# Patient Record
Sex: FEMALE | Race: WHITE
[De-identification: names, ages, dates, MRNs, and addresses within clinical notes are randomized per-mention and may not be internally consistent; named-entity substitution may affect disease eponyms.]

---

## 2018-03-28 ENCOUNTER — HOSPITAL ENCOUNTER (INPATIENT)
Dept: HOSPITAL 68 - ERH | Age: 83
LOS: 2 days | Discharge: HOME HEALTH | DRG: 193 | End: 2018-03-30
Attending: INTERNAL MEDICINE | Admitting: INTERNAL MEDICINE
Payer: COMMERCIAL

## 2018-03-28 VITALS — SYSTOLIC BLOOD PRESSURE: 142 MMHG | DIASTOLIC BLOOD PRESSURE: 60 MMHG

## 2018-03-28 VITALS — HEIGHT: 62 IN | WEIGHT: 195 LBS | BODY MASS INDEX: 35.88 KG/M2

## 2018-03-28 VITALS — SYSTOLIC BLOOD PRESSURE: 160 MMHG | DIASTOLIC BLOOD PRESSURE: 70 MMHG

## 2018-03-28 DIAGNOSIS — Z66: ICD-10-CM

## 2018-03-28 DIAGNOSIS — J44.1: ICD-10-CM

## 2018-03-28 DIAGNOSIS — Z90.2: ICD-10-CM

## 2018-03-28 DIAGNOSIS — E78.5: ICD-10-CM

## 2018-03-28 DIAGNOSIS — J96.01: ICD-10-CM

## 2018-03-28 DIAGNOSIS — Z85.118: ICD-10-CM

## 2018-03-28 DIAGNOSIS — I50.32: ICD-10-CM

## 2018-03-28 DIAGNOSIS — J10.1: Primary | ICD-10-CM

## 2018-03-28 DIAGNOSIS — Z79.51: ICD-10-CM

## 2018-03-28 DIAGNOSIS — J44.0: ICD-10-CM

## 2018-03-28 DIAGNOSIS — I11.0: ICD-10-CM

## 2018-03-28 DIAGNOSIS — J20.9: ICD-10-CM

## 2018-03-28 LAB
ABSOLUTE GRANULOCYTE CT: 5.8 /CUMM (ref 1.4–6.5)
APTT BLD: 28 SEC (ref 25–37)
BASOPHILS # BLD: 0 /CUMM (ref 0–0.2)
BASOPHILS NFR BLD: 0 % (ref 0–2)
EOSINOPHIL # BLD: 0 /CUMM (ref 0–0.7)
EOSINOPHIL NFR BLD: 0.1 % (ref 0–5)
ERYTHROCYTE [DISTWIDTH] IN BLOOD BY AUTOMATED COUNT: 18.9 % (ref 11.5–14.5)
GRANULOCYTES NFR BLD: 88.4 % (ref 42.2–75.2)
HCT VFR BLD CALC: 29.6 % (ref 37–47)
LYMPHOCYTES # BLD: 0.5 /CUMM (ref 1.2–3.4)
MCH RBC QN AUTO: 27.2 PG (ref 27–31)
MCHC RBC AUTO-ENTMCNC: 32.2 G/DL (ref 33–37)
MCV RBC AUTO: 84.4 FL (ref 81–99)
MONOCYTES # BLD: 0.2 /CUMM (ref 0.1–0.6)
PLATELET # BLD: 212 /CUMM (ref 130–400)
PMV BLD AUTO: 9.1 FL (ref 7.4–10.4)
PROTHROMBIN TIME: 12.7 SEC (ref 9.4–12.5)
RED BLOOD CELL CT: 3.51 /CUMM (ref 4.2–5.4)
WBC # BLD AUTO: 6.6 /CUMM (ref 4.8–10.8)

## 2018-03-28 NOTE — RADIOLOGY REPORT
EXAMINATION:
XR PORTABLE CHEST
 
CLINICAL INFORMATION:
History of COPD. Previous right lobectomy. Rule out pneumonia.
 
COMPARISON:
Chest x-ray dated 3/4/2018 and 5/28/2015.
 
TECHNIQUE:
Portable AP semierect view of the chest was obtained.
 
FINDINGS:
The cardiomediastinal silhouette and bilateral hilar contours are enlarged,
unchanged. Ectasia and tortuosity of the ascending and descending aorta is
seen.
 
Lungs bilaterally are symmetrically expanded. Evaluation of the lung bases is
again limited due to overlapping soft tissues. There is likely some linear
reticular opacity in the lung bases bilaterally, right greater than left,
consistent with chronic atelectasis or scarring. No definite evolving
pneumonia is seen. No effusion or pneumothorax is noted.
 
Bony structures are grossly unremarkable.
 
IMPRESSION:
 
1. Cardiomegaly and tortuous and ectatic aorta.
2. Bilateral hilar fullness, unchanged, most consistent with vascular
fullness.
3. Bibasilar chronic linear opacities, most consistent with chronic
atelectasis or scarring. No evolving superimposed pneumonia.

## 2018-03-28 NOTE — ED DYSPNEA/ASTHMA COMPLAINT
History of Present Illness
 
General
Chief Complaint: Dyspnea (COPD, CHF, Other)
Stated Complaint: SOB
Source: patient, family, old records, EMS
Exam Limitations: no limitations
 
Vital Signs & Intake/Output
Vital Signs & Intake/Output
 Vital Signs
 
 
Date Time Temp Pulse Resp B/P B/P Pulse O2 O2 Flow FiO2
 
     Mean Ox Delivery Rate 
 
 1416 98.2 66 18 124/61  93 Room Air  
 
 0913      94 Room Air  
 
 0908  74  148/68     
 
 0800      94 Room Air  
 
 0709 98.0 65 18 148/68  95 Nasal  
 
       Cannula  
 
 0000       Nasal 2.0L 
 
       Cannula  
 
 2125 98.5 73 19 160/70  96 Nasal 2.0L 
 
       Cannula  
 
 1846       Nasal 2.0L 
 
       Cannula  
 
 1829 98.3 80 19 142/60  93 Nasal 2.0L 
 
       Cannula  
 
 1640 100.0 80 24 142/69  93 Nasal 2.0L 
 
       Cannula  
 
 
 ED Intake and Output
 
 
  0000  1200
 
Intake Total 270 
 
Output Total  
 
Balance 270 
 
   
 
Intake, IV 30 
 
Intake, Oral 240 
 
Number 0 
 
Bowel  
 
Movements  
 
Patient 195 lb 195 lb
 
Weight  
 
Weight Reported by Patient Reported by Patient
 
Measurement  
 
Method  
 
 
 
Allergies
Coded Allergies:
NO KNOWN ALLERGIES (03/16/15)
 
Reconcile Medications
Albuterol Sulfate (Proair Hfa) 90 MCG HFA.AER.AD   2 PUF INH Q4-6 PRN PRN 
SHORTNESS OF BREATH  (Reported)
Atenolol 25 MG TABLET   1 TAB PO DAILY BP  (Reported)
Atorvastatin Calcium 40 MG TABLET   1 TAB PO DAILY CHOLESTEROL  (Reported)
Docusate Sodium (Colace) 100 MG CAPSULE   1 CAP PO DAILY STOOL SOFTENER  (
Reported)
Escitalopram Oxalate 20 MG TABLET   1 TAB PO DAILY MENTAL HEALTH  (Reported)
Ferrous Sulfate (IRON) 325 MG (65 MG IRON) TABLET   1 TAB PO BID SUPPLEMENT  (
Reported)
Fluticasone/Salmeterol (Advair 250-50 Diskus) 250 MCG-50 MCG/DOSE BLST.W.DEV   1
PUF INH BID BREATHING PROBLEMS  (Reported)
Furosemide 20 MG TABLET   1 TAB PO DAILY WATER RETENTION  (Reported)
Ipratropium/Albuterol Sulfate (Iprat-Albut 0.5-3(2.5) MG/3 Ml) 0.5 MG-3 MG (2.5 
MG BASE)/3 ML AMPUL.NEB   1 VIAL INH TID EMPHYSEMA/COPD  (Reported)
Melatonin 3 MG TABLET   1 TAB PO QPM SLEEP  (Reported)
Sennosides (Senna) 8.6 MG TABLET   1 TAB PO QPM CONSTIPATION  (Reported)
Tramadol HCl 50 MG TABLET   1 TAB PO BIDP PRN PAIN  (Reported)
 
Triage Nurses Notes Reviewed? yes
Onset: Abrupt
Duration: day(s): (2)
Timing: recent history
Severity: mild, moderate
Activities at Onset: none
Modifying Factors:
Worsens With: movement (EXERTION). 
Associated Symptoms: cough, SOB
HPI:
This is an 89-year-old female with history of COPD, previous lung cancer status 
post lobectomy over 10 years ago, no home oxygen therapy who presents via EMS 
from her doctor's office for chief complaint of cough, shortness of breath and 
hypoxia.  She was found to be 86% on room air and Dr. Fontenot call EMS for 
transfer to the hospital.  She admits to not feeling good for the last 2 days.  
According to the daughter approximately a month ago she was in the hospital and 
was given a second course of antibiotics and prednisone after being given one by
her PCP.  She's been okay for the past 3 weeks.  Yesterday she started emergency
pack of prednisone and amoxicillin that was given to her by Dr. Fontenot but is 
much worse today.  Patient denies any chest pain or back pain.  She denies any 
fever sweats or chills.  She was found to have a low-grade temp in the office of
his of 100.5.  She followed up once with Dr. Bryant a long time ago but does 
not see him on a routine basis.  History of PE status post surgery 10 years ago 
and was on anticoagulants for a brief time but was discontinued secondary to 
bleeding.  No significant family history of clots.  No DVT/PE diagnosed since 
then.
 
Past History
 
Travel History
Traveled to [place] past 21 day No
 
Medical History
Any Pertinent Medical History? see below for history
Cardiovascular: hypertension, hyperlipidemia
Respiratory: bronchitis, COPD, emphysema, pneumonia, R LOBECTOMY
Musculoskeletal: FRACTURE PLATE IN R  ANKLE
Psychiatric: NONE
Cancer(s): lung cancer (s/p lobectomy)
History of MRSA: No
History of VRE: No
History of CDIFF: No
 
Surgical History
Surgical History: non-contributory
 
Psychosocial History
Who do you live with Daughter
Services at Home Nursing
What is your primary language English
 
Family History
Family History, If Any:
FATHER (leukemia).
MOTHER (heart attack).
 
Hx Contributory? No
 
Review of Systems
 
Review of Systems
Constitutional:
Denies: chills, fever. 
EENTM:
Reports: no symptoms. 
Respiratory:
Reports: cough, short of breath, wheezing.  Denies: sputum production. 
Cardiovascular:
Denies: chest pain, palpitations, peripheral edema. 
GI:
Denies: abdominal pain. 
Genitourinary:
Denies: pain. 
Musculoskeletal:
Reports: no symptoms. 
Skin:
Reports: no symptoms. 
Neurological/Psychological:
Reports: no symptoms. 
Hematologic/Endocrine:
Denies: bruising, bleeding, polyuria, polydipsia. 
Immunologic/Allergic:
Reports: no symptoms. 
All Other Systems: Reviewed and Negative
 
Physical Exam
 
Physical Exam
General Appearance: well developed/nourished, alert, awake, mild distress, 
moderate distress
Head: atraumatic, normal appearance
Eyes:
Bilateral: normal appearance, PERRL. 
Ears, Nose, Throat: normal pharynx, hearing grossly normal
Neck: normal inspection, supple, full range of motion
Respiratory: decreased breath sounds, accessory muscle use, rhonchi, wheezing, 
respiratory distress
Cardiovascular: regular rate/rhythm
Peripheral Pulses:
2+ radial (R), 2+ radial (L)
Gastrointestinal: normal bowel sounds, soft, non-tender, OBESE
Extremities: pedal edema (TRACE B/L)
Neurologic/Psych: no motor/sensory deficits, awake, alert, oriented x 3, normal 
mood/affect
Skin: intact, normal color, warm/dry
 
Core Measures
ACS in differential dx? Yes
CVA/TIA Diagnosis No
Sepsis Present: No
Sepsis Focused Exam Completed? Yes
 
ED Sepsis Exam
Date of Focused Sepsis Exam: 18
Time of Focused Sepsis Exam: 1207
Sepsis Cardiac Exam: Regular Rate/Rhythm
Sepsis Resp Exam: Ronchi (AND WHEEZING)
Sepsis Cap Refill Exam: <2 Sec
Sepsis Peripheral Pulse Exam: Normal
Sepsis Peripheral Pulse Location: Radial
Sepsis Skin Color Exam: Normal for Ethnicity
Skin Temp/Moisture Exam: Warm/Dry
 
Progress
Differential Diagnosis: bronchitis, CHF, COPD, pulmonary embolism, pneumonia
Plan of Care:
 Orders
 
 
Procedure Date/time Status
 
ECHOCARDIOGRAM  1200 Active
 
MISSING MEDICATION FORM   UNK Active
 
Weight  184 Complete
 
Vital Signs  184 Active
 
Teach/Educate  184 Active
 
Pain Treatment and Response  184 Active
 
Nutritional Intake, Monitor 1840 Active
 
Isolation 03/28 1840 Active
 
Intake & Output  1840 Active
 
Patient Care Conference  1840 Active
 
Activity/Ambulation  1840 Active
 
Intake & Output  1804 Active
 
OXYGEN SETUP (GEN)  1600 Complete
 
OXYGEN SETUP CHG   UNK Complete
 
OXYGEN   UNK Complete
 
OXYGEN TRANSPORT   UNK Complete
 
MISSING MEDICATION FORM   UNK Active
 
 
 Current Medications
 
 
  Sig/Jessica Start time  Last
 
Medication Dose  Stop Time Status Admin
 
Methylprednisolone 40 MG Q12  2200 AC 
 
(Solumedrol)     
 
Atenolol 25 MG DAILY  1000 AC 
 
(Tenormin)     0908
 
Atorvastatin Calcium 40 MG DAILY  1000 AC 
 
(Lipitor)     0909
 
Azithromycin 500 MG DAILY  1000 AC 
 
(Zithromax)     1024
 
Dextrose/Water 250 ML    
 
(D5W)     
 
Enoxaparin Sodium 30 MG DAILY  1000 AC 
 
(Lovenox)     0909
 
Escitalopram Oxalate 20 MG DAILY  1000 AC 
 
(Lexapro)     0909
 
Furosemide 20 MG DAILY  1000 AC 
 
(Lasix)     0909
 
Guaifenesin 600 MG Q12  1000 AC 
 
(Mucinex)     0941
 
Melatonin 5 MG AT BEDTIME  2200 AC 
 
(Melatonin)     220
 
Oseltamivir Phosphate 30 MG BID  2200 AC 
 
(Tamiflu)    2159  0909
 
Albuterol Sulfate 2 PUF Q4-6 PRN PRN  1430 AC 
 
(Ventolin)     
 
Budesonide/ 2 PUF BID  1423 AC 
 
Formoterol Fumarate     1124
 
(Symbicort)     
 
 
1:5O PM
D/W [facility] FOR ADMISSION.  TROPONIN PENDING.
Diagnostic Imaging:
Viewed by Me: Radiology Read.  Discussed w/RAD: Radiology Read. 
CXR Impression: PATIENT: YANET BRUCE  MEDICAL RECORD NO: [id number] PRESENT 
AGE: 89  PATIENT ACCOUNT NO: [id number] : 28  LOCATION: [facility] [place] ORDERING 
PHYSICIAN: Rocio Baez MD     SERVICE DATE:  EXAM TYPE: RAD - XRY
-PORTABLE CHEST XRAY EXAMINATION: XR PORTABLE CHEST CLINICAL INFORMATION: 
History of COPD. Previous right lobectomy. Rule out pneumonia. COMPARISON: Chest
x-ray dated 3/4/2018 and 2015. TECHNIQUE: Portable AP semierect view of the
chest was obtained. FINDINGS: The cardiomediastinal silhouette and bilateral 
hilar contours are enlarged, unchanged. Ectasia and tortuosity of the ascending 
and descending aorta is seen. Lungs bilaterally are symmetrically expanded. 
Evaluation of the lung bases is again limited due to overlapping soft tissues. 
There is likely some linear reticular opacity in the lung bases bilaterally, 
right greater than left, consistent with chronic atelectasis or scarring. No 
definite evolving pneumonia is seen. No effusion or pneumothorax is noted. Bony 
structures are grossly unremarkable. IMPRESSION: 1. Cardiomegaly and tortuous 
and ectatic aorta. 2. Bilateral hilar fullness, unchanged, most consistent with 
vascular fullness. 3. Bibasilar chronic linear opacities, most consistent with 
chronic atelectasis or scarring. No evolving superimposed pneumonia. DICTATED BY
: Eliza Clements MD  DATE/TIME DICTATED:18 :
SANDY  DATE/TIME TRANSCRIBED:18 CONFIDENTIAL, DO NOT COPY 
WITHOUT APPROPRIATE AUTHORIZATION.  <Electronically signed in Other Vendor 
System>                                                                         
              SIGNED BY: Eliza Clements MD 18 123
Initial ED EKG: NSR
Rhythm Strip: normal sinus rhythm
 
Departure
 
Departure
Time of Disposition: 
Disposition: STILL A PATIENT
Condition: Stable
Clinical Impression
Primary Impression: Influenza A
Secondary Impressions: COPD exacerbation
Referrals:
Rajinder Fontenot MD (PCP/Family)
 
Departure Forms:
Customer Survey
General Discharge Information
 
Admission Note
Spoke With:
Rocío Tapia MD
Documentation of Exam:
Documentation of any treatments & extenuating circumstances including Concerns 
Regarding Discharge (functional status, medication knowledge or non-compliance, 
living conditions, etc.) that warrant an admission rather than observation: [TRC
/NEBS, IV STEROIDS, IV ABX, TAMIFLU, NASAL OXYGEN, PULMONARY EVALUATION]
 
 
Critical Care Note
 
Critical Care Note
Critical Care Time: non-applicable

## 2018-03-28 NOTE — HISTORY & PHYSICAL
Hernesto Ann 03/28/18 1355:
General Information and HPI
History of Present Illness:
Ms. Solis is a 86 year old woman with a past medical history of stage 1 
diastolic dysfunction  with a EF > 65% (2015), hypertension, hyperlipidemia, 
bronchitis, COPD not on home oxygen, and lung cancer s/p right lobectomy, PE 
post surgery placed on anticoagulation temporarily, SIB Dr. Mckeon's for 
worsening SOB, low grade fever and low O2 sats. Patient's daughter at bedside. 
Daughter reports patient has been having a persistent cough, SOB and has been 
"sick" three times in the last 6 weeks. She was treated outpatient multiple 
times with Augmentin and Prednisone but never completely resolved. Yesterday her
cough produced whitish sputum and her SOB worsened. Today she noticed watery 
discharge from R eye. She received an influenza vaccine but never tested for 
influenza. She keeps hydrated but has had a poor appetite within the last few 
weeks. She lives at an independent living facility. She visits her family 
members which many of them are also sick. She is followed by cardologist Dr. TYSON rueda though daughter reports patient does not have any cardiac conditions. She 
denies chills, CP, palpitations, myalgias, nausea, vomiting, lightheadedness, 
urinary or bowel symptoms.
 
Allergies/Medications
Allergies:
Coded Allergies:
NO KNOWN ALLERGIES (03/16/15)
 
Home Med list
Albuterol Sulfate (Proair Hfa) 90 MCG HFA.AER.AD   2 PUF INH Q4-6 PRN PRN 
SHORTNESS OF BREATH  (Reported)
Atenolol 25 MG TABLET   1 TAB PO DAILY BP  (Reported)
Atorvastatin Calcium 40 MG TABLET   1 TAB PO DAILY CHOLESTEROL  (Reported)
Docusate Sodium (Colace) 100 MG CAPSULE   1 CAP PO DAILY STOOL SOFTENER  (
Reported)
Escitalopram Oxalate 20 MG TABLET   1 TAB PO DAILY MENTAL HEALTH  (Reported)
Ferrous Sulfate (IRON) 325 MG (65 MG IRON) TABLET   1 TAB PO BID SUPPLEMENT  (
Reported)
Fluticasone/Salmeterol (Advair 250-50 Diskus) 250 MCG-50 MCG/DOSE BLST.W.DEV   1
PUF INH BID BREATHING PROBLEMS  (Reported)
Furosemide 20 MG TABLET   1 TAB PO DAILY WATER RETENTION  (Reported)
Ipratropium/Albuterol Sulfate (Iprat-Albut 0.5-3(2.5) MG/3 Ml) 0.5 MG-3 MG (2.5 
MG BASE)/3 ML AMPUL.NEB   1 VIAL INH TID EMPHYSEMA/COPD  (Reported)
Melatonin 3 MG TABLET   1 TAB PO QPM SLEEP  (Reported)
Sennosides (Senna) 8.6 MG TABLET   1 TAB PO QPM CONSTIPATION  (Reported)
Tramadol HCl 50 MG TABLET   1 TAB PO BIDP PRN PAIN  (Reported)
 
 
Past History
 
Travel History
Traveled to [place] past 21 day No
 
Medical History
Cardiovascular: hypertension, hyperlipidemia
Respiratory: bronchitis, COPD, emphysema, pneumonia, R LOBECTOMY
Musculoskeletal: FRACTURE PLATE IN R  ANKLE
Psychiatric: NONE
Cancer(s): lung cancer (s/p lobectomy)
History of MRSA: No
History of VRE: No
History of CDIFF: No
 
Surgical History
Surgical History: non-contributory
 
Past Family/Social History
 
Family History
Relations & Conditions if any
FATHER (leukemia).
MOTHER (heart attack).
 
 
Psychosocial History
Services at Home: Nursing
 
Functional Ability
ADLs
Independent: dressing, eating, toileting, bathing. 
 
Review of Systems
 
Review of Systems
Constitutional:
Reports: see HPI. 
 
Exam & Diagnostic Data
Last 24 Hrs of Vital Signs/I&O
 Vital Signs
 
 
Date Time Temp Pulse Resp B/P B/P Pulse O2 O2 Flow FiO2
 
     Mean Ox Delivery Rate 
 
03/28 1238      96 Nasal 2.0L 
 
       Cannula  
 
03/28 1226      96 Nasal 2.0L 
 
       Cannula  
 
03/28 1142 99.1 81 28 150/67  86 Room Air  
 
 
 Intake & Output
 
 
 03/28 1600 03/28 0800 03/28 0000
 
Intake Total   
 
Output Total   
 
Balance   
 
    
 
Patient 195 lb  
 
Weight   
 
Weight Reported by Patient  
 
Measurement   
 
Method   
 
 
 
 
Physical Exam
General Appearance Alert, Oriented X3, Cooperative, on 2LNC
Skin No Significant Lesion
Skin Temp/Moisture Exam: Cool/Dry
HEENT Atraumatic, PERRLA, EOMI, Mucous Membr. moist/pink
Cardiovascular Regular Rate, Normal S1, Normal S2, No Murmurs
Lungs BL wheezing, rhonchi
Abdomen Normal Bowel Sounds, Soft, No Tenderness
Extremities Trace edema
Last 24 Hrs of Labs/Nazario:
 Laboratory Tests
 
03/28/18 1244:
Anion Gap 12, Estimated GFR 59  L, BUN/Creatinine Ratio 17.8, Glucose 138  H, 
Calcium 9.2, Total Bilirubin 0.4, AST 32, ALT 27, Alkaline Phosphatase 71, 
Troponin I Pending, Pro-B-Natriuretic Pept 2240  H, Total Protein 6.3, Albumin 
3.5, Globulin 2.8, Albumin/Globulin Ratio 1.3, PT 12.7  H, INR 1.16, APTT 28, 
CBC w Diff NO MAN DIFF REQ, RBC 3.51  L, MCV 84.4, MCH 27.2, MCHC 32.2  L, RDW 
18.9  H, MPV 9.1, Gran % 88.4  H, Lymphocytes % 7.7  L, Monocytes % 3.8, 
Eosinophils % 0.1, Basophils % 0, Absolute Granulocytes 5.8, Absolute 
Lymphocytes 0.5  L, Absolute Monocytes 0.2, Absolute Eosinophils 0, Absolute 
Basophils 0
 
03/28/18 1210:
Virus Culture Pending
 Microbiology
03/28 1258  BLOOD: Blood Culture - RECD
03/28 1244  BLOOD: Blood Culture - RECD
03/28 1210  NASOPHARYN: Influenza Virus A & B Rapid Smear - COMP
                INFLUENZA TYPE A
 
 
 
Diagnostic Data
EKG Results
NSR
HR 82
QTc 468
CXR Results
IMPRESSION:
 
1. Cardiomegaly and tortuous and ectatic aorta.
2. Bilateral hilar fullness, unchanged, most consistent with vascular
fullness.
3. Bibasilar chronic linear opacities, most consistent with chronic
atelectasis or scarring. No evolving superimposed pneumonia.
 
Assessment/Plan
Assessment:
Ms. Solis is a 86 year old woman with a past medical history of stage 1 
diastolic dysfunction with a EF > 65% (2015), hypertension, hyperlipidemia, 
bronchitis, COPD not on home oxygen, and lung cancer s/p right lobectomy, PE 
post surgery placed on anticoagulation temporarily, SIB Dr. Mckeon's for 
worsening SOB, fever and low O2 sats in mid 80s. Patient received first dose IV 
Azithromycin and Ceftriaxone in ED. 
 
Problem list:
Acute hypoxic respiratory failure
Influenza A
Acute bronchitis
COPD exacerbation
 
 
Plan:
Admit to gen med for further evaluation and monitoring
TRC/nebs PRN
LRC, blood and urine cultures
Oxygen supplementation, maintain O2 > 92%
Will consider IV Azithromycin if respiratory symptoms do not improve
Tamiflu 30 mg BID x 5 days
IV Methyprednisolone
IV Lasix 20 mg x 1 dose for CXR findings of vascular congestion
Continue home meds: Atenolol 25mg, Atorvastatin 40 mg, Escitalopram 20 mg, Lasix
20 mg
Family request Pulm consult with Dr. Wasserman/Ivy though patient followed Dr. Bryant in the past.
 
Diet: Heart healthy
DVT ppx: sc Enoxaparin
Code: DNR/I
 
As Ranked By This Provider
Problem List:
 1. Influenza A
 
 2. COPD exacerbation
 
 3. Bronchitis
 
 
Core Measures/Misc (9/17)
 
Acute Coronary Syndrome
ACS Diagnosis: No
 
Congestive Heart Failure
Congestive Heart Failure Diagnosis No
 
Cerebrovascular Accident
CVA/TIA Diagnosis: No
 
VTE (View Protocol)
VTE Risk Factors Age>40
No Mechanical VTE Prophylaxis d/t N/A MechProphylax Ordered
No VTE Pharm Prophylaxis d/t NA PharmProphylax ordered
 
Sepsis (View protocol)
Sepsis Present: No
 
 
Desiree Pabon 03/28/18 1445:
Resident Review Statement
Resident Statement: examined this patient, discussed with intern
Other Findings:
Patient is a 29-year-old female with past medical history of COPD not on home 
oxygen, lung cancer status post right lobectomy, hypertension, hyperlipidemia, 
bronchitis was brought in from her PCPs office with a chief complaint of cough, 
shortness of breath and productive sputum for the past 2 weeks.
 
Patient has been having cough with productive white sputum for the past 3 weeks.
 She was treated with by po antibiotics and steroids 3 times in the last few 
weeks for these symptoms.  However her symptoms did not improve but it actually 
worsened in the past 2 days.  She was never tested for flu in her previous 
episodes.  She received a flu shot this year.  Today, she was seen at Dr. Fontenot
office today and was found to be hypoxic to 86% on room air.  She also had a low
-grade temp of 100.5.  .Patient denies any chest pain, palpitations, nausea, 
vomiting, fever, chills, urinary or bowel symptoms.  Several people in the 
household are sick with flu.  She was followed up with Dr. Bryant in the past .
 
Vitals in the ED temperature 99.1, pulse 81, respiration 28, blood pressure 150/
67, saturating 86% on room air
 
Labs significant for hemoglobin of 9.6, HCT 29.6, proBNP 2240
Influenza A positive
 
Chest x-ray shows cardiomegaly, bilateral hilar fullness likely vascular 
congestion, no pneumonia.
 
She was started on Tamiflu, and received IV steroids, ceftriaxone and 
azithromycin in the ED
 
Physical examination
General :awake, alert and oriented in no distress
HEENT: PERRLA, EOMI
Chest: Diminished breath sounds bilaterally, bilateral rhonchi and wheezing
CVS: S1-S2, no murmurs
Extremities: Trace pedal edema bilaterally
 
Assessment
 
Acute hypoxemic respiratory failure secondary to acute bronchitis/COPD 
exacerbation and influenza
-Admit to [facility]
-Vitals per protocol
-Supplemental oxygen to maintain saturation 92%
-TRC nebs around-the-clock
-Continue Tamiflu
-Continue steroids Solu-Medrol 40 every 8hrs
-Received ceftriaxone and azithromycin in the ED.  We will watch off antibiotics
-Blood and sputum culture
-Continue home inhalers
-Pulmonology consult in a.m.
 
Hypertension-continue atenolol
Hyperlipidemia-continue atorvastatin
Depression-continue Lexapro and melatonin at bedtime
 
DVT prophylaxis subcutaneous Lovenox
DNR/DNI
Mild pain pathway
 
 
 
Jenae Francois MD 03/28/18 1910:
Attending MD Review Statement
 
Attending Statement
Attending MD Statement: examined this patient, discuss w/resident/PA/NP, agreed 
w/resident/PA/NP, reviewed EMR data (avail)
Attending Assessment/Plan:
89F with several day history of dyspnea, productive cough, malaise, and weakness
, with multiple sick contacts positive for influenza, found to be influenza A 
positive with a negative CXR.  Afebrile, normal WBC, stable vitals.  Mild 
wheezing on exam, former smoker.  Will start Tamiflu, Solumedrol, sputum culture
, nebulizer treatments, continue home meds, DVT PPx

## 2018-03-29 VITALS — SYSTOLIC BLOOD PRESSURE: 118 MMHG | DIASTOLIC BLOOD PRESSURE: 52 MMHG

## 2018-03-29 VITALS — DIASTOLIC BLOOD PRESSURE: 68 MMHG | SYSTOLIC BLOOD PRESSURE: 148 MMHG

## 2018-03-29 VITALS — SYSTOLIC BLOOD PRESSURE: 124 MMHG | DIASTOLIC BLOOD PRESSURE: 61 MMHG

## 2018-03-29 NOTE — PN- HOUSESTAFF
**See Addendum**
Subjective
Follow-up For:
Acute hypoxic respiratory failure
Influenza A
Acute bronchitis
COPD exacerbation
Subjective:
Patient reports wet cough but unable to bring up any sputum. Denies rhinorrhea, 
SOB, CP, nausea, vomiting
 
Review of Systems
Constitutional:
Reports: see HPI. 
 
Objective
Last 24 Hrs of Vital Signs/I&O
 Vital Signs
 
 
Date Time Temp Pulse Resp B/P B/P Pulse O2 O2 Flow FiO2
 
     Mean Ox Delivery Rate 
 
 0709 98.0 65 18 148/68  95 Nasal  
 
       Cannula  
 
 0000       Nasal 2.0L 
 
       Cannula  
 
 2125 98.5 73 19 160/70  96 Nasal 2.0L 
 
       Cannula  
 
 1846       Nasal 2.0L 
 
       Cannula  
 
 1829 98.3 80 19 142/60  93 Nasal 2.0L 
 
       Cannula  
 
 1640 100.0 80 24 142/69  93 Nasal 2.0L 
 
       Cannula  
 
 1530 98.7 76 20 136/63  94 Nasal 2.0L 
 
       Cannula  
 
 1400      93 Nasal 3.0L 
 
       Cannula  
 
 1238      96 Nasal 2.0L 
 
       Cannula  
 
 1226      96 Nasal 2.0L 
 
       Cannula  
 
 1142 99.1 81 28 150/67  86 Room Air  
 
 
 Intake & Output
 
 
  1600  0800  0000
 
Intake Total  250 270
 
Output Total   
 
Balance  250 270
 
    
 
Intake, IV  10 30
 
Intake, Oral  240 240
 
Number  0 0
 
Bowel   
 
Movements   
 
Patient   195 lb
 
Weight   
 
Weight   Reported by Patient
 
Measurement   
 
Method   
 
 
 
 
Physical Exam
General Appearance: Alert, Oriented X3, Cooperative, on 2LNC
Cardiovascular: Regular Rate, Normal S1, Normal S2, No Murmurs
Lungs: BL wheezing and rhonchi
Current Medications:
 Current Medications
 
 
  Sig/Jessica Start time  Last
 
Medication Dose Route Stop Time Status Admin
 
Albuterol Sulfate 2 PUF Q4-6 PRN PRN  1430 AC 
 
  INH   
 
Albuterol Sulfate 3 ML ONCE ONE  1330 DC 
 
  INH  1331  1328
 
Albuterol Sulfate 3 ML ONCE ONE  1200 DC 
 
  INH  1201  1227
 
Atenolol 25 MG DAILY  1000 AC 
 
  PO   
 
Atorvastatin Calcium 40 MG DAILY  1000 AC 
 
  PO   
 
Azithromycin 500 MG DAILY  1000 AC 
 
Dextrose/Water 250 ML IV   
 
Azithromycin 500 MG ONCE ONE  1245 DC 
 
Dextrose/Water 250 ML IV  1344  1303
 
Budesonide/ 2 PUF BID  1423 AC 
 
Formoterol Fumarate  INH   1500
 
Ceftriaxone Sodium 0 .STK-MED ONE  1255 DC 
 
  .ROUTE   
 
Ceftriaxone Sodium 1,000 MG ONCE ONE  1245 DC 
 
  IV  1246  1303
 
Enoxaparin Sodium 30 MG DAILY  1000 AC 
 
  SC   
 
Escitalopram Oxalate 20 MG DAILY  1000 AC 
 
  PO   
 
Furosemide 20 MG DAILY  1000 AC 
 
  PO   
 
Furosemide 0 .STK-MED ONE  1604 DC 
 
  IV   
 
Furosemide 20 MG ONCE ONE  1500 DC 
 
  IV PUSH  1501  1530
 
Guaifenesin 600 MG Q12  1000 UNVr 
 
  PO   
 
Guaifenesin 10 ML ONCE ONE  0400 DC 
 
  PO  0401  0535
 
Ipratropium Bromide 2.5 ML ONCE ONE  1330 DC 
 
  INH  1331  1328
 
Ipratropium Bromide 2.5 ML ONCE ONE  1200 DC 
 
  INH  1201  1227
 
Melatonin 5 MG AT BEDTIME  2200 AC 
 
  PO   2202
 
Methylprednisolone 40 MG Q8  2200 AC 
 
  IV   0531
 
Methylprednisolone 0 .STK-MED ONE  1226 DC 
 
  .ROUTE   
 
Methylprednisolone 125 MG ONCE ONE  1200 DC 
 
  IV  1201  1215
 
Oseltamivir Phosphate 30 MG BID  2200 AC 
 
  PO  2159  2126
 
Oseltamivir Phosphate 30 MG ONCE ONE  1245 DC 
 
  PO  1246  1303
 
 
 
 
Last 24 Hrs of Lab/Nazario Results
Last 24 Hrs of Labs/Mics:
 Laboratory Tests
 
18 1244:
Anion Gap 12, Estimated GFR 59  L, BUN/Creatinine Ratio 17.8, Glucose 138  H, 
Calcium 9.2, Total Bilirubin 0.4, AST 32, ALT 27, Alkaline Phosphatase 71, 
Troponin I 0.02, Pro-B-Natriuretic Pept 2240  H, Total Protein 6.3, Albumin 3.5,
Globulin 2.8, Albumin/Globulin Ratio 1.3, PT 12.7  H, INR 1.16, APTT 28, CBC w 
Diff NO MAN DIFF REQ, RBC 3.51  L, MCV 84.4, MCH 27.2, MCHC 32.2  L, RDW 18.9  H
, MPV 9.1, Gran % 88.4  H, Lymphocytes % 7.7  L, Monocytes % 3.8, Eosinophils % 
0.1, Basophils % 0, Absolute Granulocytes 5.8, Absolute Lymphocytes 0.5  L, 
Absolute Monocytes 0.2, Absolute Eosinophils 0, Absolute Basophils 0
 
18 1210:
Virus Culture Pending
 Microbiology
 144  LOWER RESP: Respiratory Culture - COLB
 144  LOWER RESP: Gram Stain - COLB
 1448  BLOOD: Blood Culture - CAN
                Cancelled: Cancelled via OE: Duplicate Order
 144  BLOOD: Blood Culture - CAN
                Cancelled: Cancelled via OE: Duplicate Order
 1258  BLOOD: Blood Culture - RECD
 1244  BLOOD: Blood Culture - RECD
 1210  NASOPHARYN: Influenza Virus A & B Rapid Smear - COMP
                INFLUENZA TYPE A
 
 
 
Assessment/Plan
Assessment:
Ms. Solis is a 86 year old woman with a past medical history of stage 1 
diastolic dysfunction with a EF > 65% (), hypertension, hyperlipidemia, 
bronchitis, COPD not on home oxygen, and lung cancer s/p right lobectomy, PE 
post surgery placed on anticoagulation temporarily, SIB Dr. Mckeon's for 
worsening SOB, fever and low O2 sats in mid 80s. Patient received first dose IV 
Azithromycin and Ceftriaxone in ED. 
 
 
Problem list:
Acute hypoxic respiratory failure
Influenza A
Acute bronchitis
COPD exacerbation
 
 
Plan:
TRC/nebs PRN
Await LRC, blood and urine cultures
Oxygen supplementation, maintain O2 > 92%
Will consider IV Azithromycin if respiratory symptoms do not improve
Start Guaifenesin BID
Tamiflu 30 mg BID x 5 days
IV Methyprednisolone
IV Lasix 20 mg x 1 dose for CXR findings of vascular congestion
Obtain ECHO to r/o SHD and/or RWMA
Continue home meds: Atenolol 25mg, Atorvastatin 40 mg, Escitalopram 20 mg, Lasix
20 mg
Pulm recommendations appreciated (Family request Pulm consult with Dr. Wasserman/
[place] though patient followed Dr. Bryant in the past.)
Problem List:
 1. COPD exacerbation
 
 2. Influenza A
 
 3. Bronchitis
 
Pain Ratin
Pain Location:
NA
Pain Goal: Remain pain free
Pain Plan:
NA
Tomorrow's Labs & Rationales:
none

## 2018-03-29 NOTE — CONS- PULMONARY
General Information and HPI
 
Consulting Request
Date of Consult: 03/29/18
Requested By:
Dr. Francois
Reason for Consult:
inluenza, dyspnea, cough
Source of Information: patient
Exam Limitations: no limitations
History of Present Illness:
89 year old woman. Hx of diastolic chf, COPD, hx of lung ca s/p right lobectomy.
 
Presented at the request of Dr. Fontenot for dyspnea, log rade temp, desaturation.
Persistent cough, for the past month of so. Tx with Augmentin nad Prednisone. 
She also had conjunctivitis. Tested positive for Influenza A. 
CXR with chronic changes, without infiltrates. 
Overall better. Not on oxygen. 
No n/v/d/c. No cp, no HA. No fevers at this time. 
 
Allergies/Medications
Allergies:
Coded Allergies:
NO KNOWN ALLERGIES (03/16/15)
 
Home Med List:
Albuterol Sulfate (Proair Hfa) 90 MCG HFA.AER.AD   2 PUF INH Q4-6 PRN PRN 
SHORTNESS OF BREATH  (Reported)
Atenolol 25 MG TABLET   1 TAB PO DAILY BP  (Reported)
Atorvastatin Calcium 40 MG TABLET   1 TAB PO DAILY CHOLESTEROL  (Reported)
Docusate Sodium (Colace) 100 MG CAPSULE   1 CAP PO DAILY STOOL SOFTENER  (
Reported)
Escitalopram Oxalate 20 MG TABLET   1 TAB PO DAILY MENTAL HEALTH  (Reported)
Ferrous Sulfate (IRON) 325 MG (65 MG IRON) TABLET   1 TAB PO BID SUPPLEMENT  (
Reported)
Fluticasone/Salmeterol (Advair 250-50 Diskus) 250 MCG-50 MCG/DOSE BLST.W.DEV   1
PUF INH BID BREATHING PROBLEMS  (Reported)
Furosemide 20 MG TABLET   1 TAB PO DAILY WATER RETENTION  (Reported)
Ipratropium/Albuterol Sulfate (Iprat-Albut 0.5-3(2.5) MG/3 Ml) 0.5 MG-3 MG (2.5 
MG BASE)/3 ML AMPUL.NEB   1 VIAL INH TID EMPHYSEMA/COPD  (Reported)
Melatonin 3 MG TABLET   1 TAB PO QPM SLEEP  (Reported)
Sennosides (Senna) 8.6 MG TABLET   1 TAB PO QPM CONSTIPATION  (Reported)
Tramadol HCl 50 MG TABLET   1 TAB PO BIDP PRN PAIN  (Reported)
 
Current Medications:
 Current Medications
 
 
  Sig/Jessica Start time  Last
 
Medication Dose Route Stop Time Status Admin
 
Albuterol Sulfate 2 PUF Q4-6 PRN PRN 03/28 1430 AC 
 
  INH   
 
Atenolol 25 MG DAILY 03/29 1000 AC 03/29
 
  PO   0908
 
Atorvastatin Calcium 40 MG DAILY 03/29 1000 AC 03/29
 
  PO   0909
 
Azithromycin 500 MG DAILY 03/29 1000 AC 03/29
 
Dextrose/Water 250 ML IV   1024
 
Budesonide/ 2 PUF BID 03/28 1423 AC 03/29
 
Formoterol Fumarate  INH   1124
 
Enoxaparin Sodium 30 MG DAILY 03/29 1000 AC 03/29
 
  SC   0909
 
Escitalopram Oxalate 20 MG DAILY 03/29 1000 AC 03/29
 
  PO   0909
 
Furosemide 20 MG DAILY 03/29 1000 AC 03/29
 
  PO   0909
 
Furosemide 0 .STK-MED ONE 03/28 1604 DC 
 
  IV   
 
Furosemide 20 MG ONCE ONE 03/28 1500 DC 03/28
 
  IV PUSH 03/28 1501  1530
 
Guaifenesin 600 MG Q12 03/29 1000 AC 03/29
 
  PO   0941
 
Guaifenesin 10 ML ONCE ONE 03/29 0400 DC 03/29
 
  PO 03/29 0401  0535
 
Melatonin 5 MG AT BEDTIME 03/28 2200 AC 03/28
 
  PO   2202
 
Methylprednisolone 40 MG Q8 03/28 2200 AC 03/29
 
  IV   1344
 
Oseltamivir Phosphate 30 MG BID 03/28 2200 AC 03/29
 
  PO 04/01 2159  0909
 
 
 
 
Review of Systems
Comments
18 point review of systems was performed and reviewed. Please see pertinent 
positives and pertinent negatives in the HPI. Otherwise ROS is negative.
 
Past History
 
Travel History
Traveled to [place] past 21 day No
 
Medical History
Blood Transfusion Hx: Yes
Neurological: NONE
EENT: NONE
Cardiovascular: hypertension, hyperlipidemia
Respiratory: bronchitis, COPD, emphysema, pneumonia, R LOBECTOMY
Gastrointestinal: SBO
Hepatic: NONE
Renal: NONE
Musculoskeletal: FRACTURE PLATE IN R  ANKLE
Psychiatric: NONE
Endocrine: NONE
Blood Disorders: NONE
Cancer(s): lung cancer (s/p lobectomy)
GYN/Reproductive: NONE
 
Surgical History
Surgical History: non-contributory
 
Family History
Relations & Conditions If Any:
FATHER (leukemia).
MOTHER (heart attack).
 
 
Psychosocial History
Where Do You Live? Home
Services at Home: None
Smoking Status: Former Smoker
 
Functional Ability
ADLs
Independent: dressing, eating, toileting, bathing. 
 
Exam & Diagnostic Data
Last 24 Hrs of Vital Signs/I&O
 Vital Signs
 
 
Date Time Temp Pulse Resp B/P B/P Pulse O2 O2 Flow FiO2
 
     Mean Ox Delivery Rate 
 
03/29 0913      94 Room Air  
 
03/29 0908  74  148/68     
 
03/29 0800      94 Room Air  
 
03/29 0709 98.0 65 18 148/68  95 Nasal  
 
       Cannula  
 
03/29 0000       Nasal 2.0L 
 
       Cannula  
 
03/28 2125 98.5 73 19 160/70  96 Nasal 2.0L 
 
       Cannula  
 
03/28 1846       Nasal 2.0L 
 
       Cannula  
 
03/28 1829 98.3 80 19 142/60  93 Nasal 2.0L 
 
       Cannula  
 
03/28 1640 100.0 80 24 142/69  93 Nasal 2.0L 
 
       Cannula  
 
03/28 1530 98.7 76 20 136/63  94 Nasal 2.0L 
 
       Cannula  
 
 
 Intake & Output
 
 
 03/29 1600 03/29 0800 03/29 0000
 
Intake Total  250 270
 
Output Total   
 
Balance  250 270
 
    
 
Intake, IV  10 30
 
Intake, Oral  240 240
 
Number  0 0
 
Bowel   
 
Movements   
 
Patient   195 lb
 
Weight   
 
Weight   Reported by Patient
 
Measurement   
 
Method   
 
 
 
 
Physical Exam
Other Physical Findings:
gen awake and alert
heent ncat
cvs s1, s2
lungs rare rhonchi
abd soft
ext without edema
Last 48 Hrs of Labs/Nazario:
 Laboratory Tests
 
03/28/18 1244:
Anion Gap 12, Estimated GFR 59  L, BUN/Creatinine Ratio 17.8, Glucose 138  H, 
Calcium 9.2, Total Bilirubin 0.4, AST 32, ALT 27, Alkaline Phosphatase 71, 
Troponin I 0.02, Pro-B-Natriuretic Pept 2240  H, Total Protein 6.3, Albumin 3.5,
Globulin 2.8, Albumin/Globulin Ratio 1.3, PT 12.7  H, INR 1.16, APTT 28, CBC w 
Diff NO MAN DIFF REQ, RBC 3.51  L, MCV 84.4, MCH 27.2, MCHC 32.2  L, RDW 18.9  H
, MPV 9.1, Gran % 88.4  H, Lymphocytes % 7.7  L, Monocytes % 3.8, Eosinophils % 
0.1, Basophils % 0, Absolute Granulocytes 5.8, Absolute Lymphocytes 0.5  L, 
Absolute Monocytes 0.2, Absolute Eosinophils 0, Absolute Basophils 0
 
03/28/18 1210:
Virus Culture Pending
 Microbiology
03/28 1210  NASOPHARYN: Influenza Virus A & B Rapid Smear - COMP
                INFLUENZA TYPE A
 
 
 
Assessment/Plan
Impression/Plan:
89 year old woman. Hx of diastolic chf, COPD, hx of lung ca s/p right lobectomy.
 
Presented at the request of Dr. Fontenot for dyspnea, log rade temp, desaturation.
Persistent cough, for the past month of so. Tx with Augmentin nad Prednisone. 
She also had conjunctivitis. Tested positive for Influenza A. 
CXR with chronic changes, without infiltrates. 
Overall better. Not on oxygen. 
No n/v/d/c. No cp, no HA. No fevers at this time. 
 
* COPD exacerbation secondary to influenza type A
* elevated BNP
 
Plan
-trc/nebs
-tamiflu course
-reduce solumedrol to 40mg iv q12h
-consider ECHO if not done recently given elevated BNP
-cont mucinex and symbicort
-oob to chair
 
DVT prophylaxis at all times
 
 
Consult Acknowledgment
- Thank you for your consult request.

## 2018-03-29 NOTE — ECHOCARDIOGRAM REPORT
YANET BRUCE 
 
 Age:    89     :    1928      Gender:     F 
 
 MRN:    [id number] 
 
 Exam Date:     2018  
                15:14 
 
 Exam Location: [address] [place] [address] A 
 
 Ht (in):     62      Wt (lb):      195     BSA:    2.01 
 
 BP:          148     /     68 
 
 Ordering Physician:        Hernesto Ann MD 
 
 Referring Physician:       Hernesto Ann MD 
 
 Technologist:              Tylor Vaughan [facility] 
 
 Room Number:               207-1 
 
 Indications:       SHORTNESS OF BREATH 
 
 Rhythm:                 Sinus 
 
 Technical Quality:      fair 
 
 FINDINGS 
 
 Left Ventricle 
 Normal global left ventricular size, wall thickness, systolic  
 function with no obvious regional wall motion abnormalities. Left  
 ventricular ejection fraction is estimated at     >65 %. Normal left  
 ventricular diastolic filling pattern for age. 
 
 Right Ventricle 
 Right ventricle not well visualized, grossly normal. 
 
 Right Atrium 
 Right atrium not well visualized, grossly normal. 
 
 Left Atrium 
 Mild left atrial dilatation. 
 
 Mitral Valve 
 Mild thickening/calcification of the mitral valve leaflets. Moderate  
 mitral annular calcification. Trace mitral regurgitation. 
 
 Aortic Valve 
 Aortic valve not well visualized, grossly normal. No aortic  
 stenosis. 
 
 Tricuspid Valve 
 Tricuspid valve is normal in structure and function. Trace to mild  
 tricuspid regurgitation. Right ventricular systolic pressure  
 estimated to be within the normal range at 20-25   mmHg. 
 
 Pulmonic Valve 
 Pulmonic valve not well visualized, grossly normal. No pulmonic  
 regurgitation. 
 
 Pericardium 
 No pericardial or pleural effusion. 
 
 Great Vessels 
 Normal size aortic root. Aortic arch and great vessels not  
 visualized. 
 
 CONCLUSIONS 
 Normal global left ventricular size, wall thickness, systolic  
 function with no obvious regional wall motion abnormalities. 
 Left ventricular ejection fraction is estimated at     >65 %. 
 Mild left atrial dilatation. 
 Mild thickening/calcification of the mitral valve leaflets. 
 Moderate mitral annular calcification. 
 Trace mitral regurgitation. 
 Aortic valve not well visualized, grossly normal. 
 Right ventricular systolic pressure estimated to be within the  
 normal range at 20-25   mmHg. 
 Aortic arch and great vessels not visualized. 
 
 Luis Barone M.D. 
 (Electronically Signed) 
 Final Date:      2018  
                  17:59 
 
 MEASUREMENTS  (Male / Female) Normal Values 
 
 2D ECHO 
 LV Diastolic Diameter PLAX        4.7 cm                4.2 - 5.9 / 3.9 - 5.3 
cm 
 LV Systolic Diameter PLAX         2.8 cm                2.1 - 4.0 cm 
 LV Fractional Shortening PLAX     40.4 %                25 - 46  % 
 LV Ejection Fraction 2D Teich     71.1 %                 
 IVS Diastolic Thickness           1.0 cm                 
 LVPW Diastolic Thickness          0.9 cm                 
 LV Relative Wall Thickness        0.4                    
 LVOT Diameter                     2.4 cm                 
 Aortic Root Diameter              3.1 cm                 
 LV Ejection Fraction MOD BP       62.5 %                >= 55  % 
 LV Cardiac Index MOD BP           1613.1 cm/minm      
 LV Diastolic Length 4C            7.1 cm                6.9 - 10.3 cm 
 LV Diastolic Area 4C              27.0 cm               
 LV Diastolic Volume MOD 4C        84.0 cm               
 LV Ejection Fraction MOD 4C       75.0 %                 
 LV Stroke Volume MOD 4C           63.0 cm               
 LV Cardiac Index MOD 4C           2540.6 cm/minm      
 LV Systolic Length 4C             6.4 cm                 
 LV Systolic Area 4C               12.8 cm               
 LV Systolic Volume MOD 4C         21.0 cm               
 LV Ejection Fraction MOD 2C       44.9 %                 
 LV Cardiac Index MOD 2C           887.2 cm/minm       
 LV Diastolic Volume 4C AL         87.3 cm              85 - 139 / 69 - 109 cm
 
 LV Systolic Volume 4C AL          21.7 cm               
 LV Ejection Fraction 4C AL        75.1 %                 
 LV Stroke Volume 4C AL            65.6 cm               
 LV Cardiac Index 4C AL            2644.8 cm/minm      
 LV Ejection Fraction 2C AL        47.3 %                 
 LV Cardiac Index 2C AL            952.4 cm/minm       
 LA Volume                         54.0 cm              18 - 58 / 22 - 52 cm 
 
 DOPPLER 
 AV Peak Velocity                  160.0 cm/s             
 AV Peak Gradient                  10.2 mmHg              
 LVOT Peak Velocity                102.0 cm/s             
 LVOT Peak Gradient                4.2 mmHg               
 AV Area Cont Eq pk                2.9 cm                
 Mitral E Point Velocity           133.0 cm/s             
 Mitral A Point Velocity           119.0 cm/s             
 Mitral E to A Ratio               1.1                    
 MV Deceleration Time              356.0 ms               
 TR Peak Velocity                  240.0 cm/s             
 TR Peak Gradient                  23.0 mmHg              
 LV E' Lateral Velocity            7.4 cm/s               
 Mitral E to LV E' Lateral Ratio   17.9                   
 LV E' Septal Velocity             4.9 cm/s               
 Mitral E to LV E' Septal Ratio    27.3

## 2018-03-30 VITALS — DIASTOLIC BLOOD PRESSURE: 62 MMHG | SYSTOLIC BLOOD PRESSURE: 140 MMHG

## 2018-03-30 VITALS — DIASTOLIC BLOOD PRESSURE: 74 MMHG | SYSTOLIC BLOOD PRESSURE: 136 MMHG

## 2018-03-30 NOTE — PN- PULMONARY
Subjective
HPI/Critical Care Issues:
Patient seen and examined this morning.  She is anticipating discharge and she 
is on room air and comfortable.
 
Objective
Current Medications:
 Current Medications
 
 
  Sig/Jessica Start time  Last
 
Medication Dose Route Stop Time Status Admin
 
Albuterol Sulfate 2 PUF Q4-6 PRN PRN 03/28 1430 AC 
 
  INH   
 
Atenolol 25 MG DAILY 03/29 1000 AC 03/30
 
  PO   0956
 
Atorvastatin Calcium 40 MG DAILY 03/29 1000 AC 03/30
 
  PO   0956
 
Azithromycin 500 MG DAILY 03/29 1000 AC 03/30
 
Dextrose/Water 250 ML IV   0956
 
Budesonide/ 2 PUF BID 03/28 1423 AC 03/30
 
Formoterol Fumarate  INH   0957
 
Enoxaparin Sodium 30 MG DAILY 03/29 1000 AC 03/30
 
  SC   0957
 
Escitalopram Oxalate 20 MG DAILY 03/29 1000 AC 03/30
 
  PO   0956
 
Furosemide 20 MG DAILY 03/29 1000 AC 03/30
 
  PO   0956
 
Guaifenesin 10 ML Q6P PRN 03/29 1615 AC 03/30
 
  PO   0602
 
Guaifenesin 600 MG Q12 03/29 1000 DC 03/29
 
  PO   0941
 
Melatonin 5 MG AT BEDTIME 03/28 2200 AC 03/29
 
  PO   2055
 
Methylprednisolone 40 MG Q12 03/29 2200 AC 03/30
 
  IV   0956
 
Methylprednisolone 40 MG Q8 03/28 2200 DC 03/29
 
  IV   1344
 
Oseltamivir Phosphate 30 MG BID 03/28 2200 AC 03/30
 
  PO 04/01 2159  0956
 
 
 
 
Vital Signs & I&O
Last 24 Hrs of Vitals and I&O:
 Vital Signs
 
 
Date Time Temp Pulse Resp B/P B/P Pulse O2 O2 Flow FiO2
 
     Mean Ox Delivery Rate 
 
03/30 0956  65  140/62     
 
03/30 0800      94 Room Air  
 
03/30 0713 97.5 69 19 136/74  91   
 
03/30 0000      91 Room Air  
 
03/29 2211 98.1 62 19 118/52  91 Room Air  
 
03/29 1600      94 Nasal 1.0L 
 
       Cannula  
 
03/29 1416 98.2 66 18 124/61  93 Room Air  
 
 
 Intake & Output
 
 
 03/30 1600 03/30 0800 03/30 0000
 
Intake Total  240 120
 
Output Total   300
 
Balance  240 -180
 
    
 
Intake, Oral  240 120
 
Output, Urine   300
 
 
 
 
Exam
Other Physical Findings:
gen awake and alert
heent ncat
cvs s1, s2
lungs rare rhonchi
abd soft
ext without edema
 
Impression/Plan
 
Impression/Plan
Impression/Plan:
Impression
89 year old woman. Hx of diastolic chf, COPD, hx of lung ca s/p right lobectomy.
 
 
* COPD exacerbation secondary to influenza type A
* elevated BNP
 
Plan
-trc/nebs
-tamiflu course
-taper prednisone by 10mg every 2 days
-cont mucinex and symbicort
 
DC planning
 
DVT prophylaxis at all times

## 2018-03-30 NOTE — PATIENT DISCHARGE INSTRUCTIONS
Discharge Instructions
 
General Discharge Information
You were seen/treated for:
Acute hypoxic respiratory failure
Influenza A
Acute bronchitis
COPD exacerbation
You had these procedures:
none
Special Instructions:
Follow up with the Pulmonologist within 1 week of discharge
 
Follow up with your PCP within 1-2 weeks of discharge
 
Complete your antibiotics and prednisone taper
 
Diet
Continue normal diet: Yes
 
Activity
Full Activity/No Limits: Yes
 
Acute Coronary Syndrome
 
Inclusion Criteria
At DC or during hospital stay patient has or had the following:
ACS DIAGNOSIS No
 
Discharge Core Measures
Meds if any: Prescribed or Continued at Discharge
Meds if any: NOT Prescribed or Continued at Discharge
 
Congestive Heart Failure
 
Inclusion Criteria
At DC or during hospital stay patient has or had the following:
CHF DIAGNOSIS No
 
Discharge Core Measures
Meds if any: Prescribed or Continued at Discharge
Meds if any: NOT Prescribed or Continued at Discharge
 
Cerebrovascular accident
 
Inclusion Criteria
At DC or during hospital stay patient has or had the following:
CVA/TIA Diagnosis No
 
Discharge Core Measures
Meds if any: Prescribed or Continued at Discharge
Meds if any: NOT Prescribed or Continued at Discharge
 
Venous thromboembolism
 
Inclusion Criteria
VTE Diagnosis No
VTE Type NONE
VTE Confirmed by (Test) NONE
 
Discharge Core Measures
- Per Current guidelines, there needs to be overlap
- treatment for the first 5 days of Warfarin therapy.
- If discharged on Warfarin prior to 5 days of
- overlap therapy, the patient will need to be
- assessed for post discharge needs including
- *Post discharge parental anticoagulation
- *Warfarin and/or parental anticoagulation education
- *Follow up date to check INR post discharge
At least 5 days overlap therapy as Inpatient No
Meds if any: Prescribed or Continued at Discharge
Note: Overlap Therapy is Warfarin and Anticoagulant
Meds if any: NOT Prescribed or Continued at Discharge

## 2018-03-30 NOTE — PN- HOUSESTAFF
Hernesto Ann 18 0706:
Subjective
Follow-up For:
Acute hypoxic respiratory failure
Influenza A
Acute bronchitis
COPD exacerbation
Subjective:
No complaints or acute events overnight
 
Review of Systems
Constitutional:
Reports: see HPI. 
 
Objective
Last 24 Hrs of Vital Signs/I&O
 Vital Signs
 
 
Date Time Temp Pulse Resp B/P B/P Pulse O2 O2 Flow FiO2
 
     Mean Ox Delivery Rate 
 
 0713 97.5 69 19 136/74  91   
 
 0000      91 Room Air  
 
 2211 98.1 62 19 118/52  91 Room Air  
 
 1600      94 Nasal 1.0L 
 
       Cannula  
 
 1416 98.2 66 18 124/61  93 Room Air  
 
 0913      94 Room Air  
 
 0908  74  148/68     
 
 0800      94 Room Air  
 
 
 Intake & Output
 
 
  0800  0000  1600
 
Intake Total 240 120 750
 
Output Total  300 
 
Balance 240 -180 750
 
    
 
Intake, IV   250
 
Intake, Oral 240 120 500
 
Output, Urine  300 
 
 
 
 
Physical Exam
General Appearance: Alert, Oriented X3, Cooperative, No Acute Distress
Cardiovascular: Regular Rate, Normal S1, Normal S2, No Murmurs
Lungs: Mild LLE wheezing
Abdomen: Normal Bowel Sounds, Soft, No Tenderness
Extremities: No Edema
Current Medications:
 Current Medications
 
 
  Sig/Jessica Start time  Last
 
Medication Dose Route Stop Time Status Admin
 
Albuterol Sulfate 2 PUF Q4-6 PRN PRN  1430 AC 
 
  INH   
 
Atenolol 25 MG DAILY  1000 AC 
 
  PO   0908
 
Atorvastatin Calcium 40 MG DAILY  1000 AC 
 
  PO   0909
 
Azithromycin 500 MG DAILY  1000 AC 
 
Dextrose/Water 250 ML IV   1024
 
Budesonide/ 2 PUF BID  1423 AC 
 
Formoterol Fumarate  INH   2019
 
Enoxaparin Sodium 30 MG DAILY  1000 AC 
 
  SC   0909
 
Escitalopram Oxalate 20 MG DAILY  1000 AC 
 
  PO   0909
 
Furosemide 20 MG DAILY  1000 AC 
 
  PO   0909
 
Guaifenesin 10 ML Q6P PRN  1615 AC 
 
  PO   0602
 
Guaifenesin 600 MG Q12  1000 DC 
 
  PO   0941
 
Melatonin 5 MG AT BEDTIME  2200 AC 
 
  PO   2055
 
Methylprednisolone 40 MG Q12 2200 AC 
 
  IV   2018
 
Methylprednisolone 40 MG Q8 2200 DC 
 
  IV   1344
 
Oseltamivir Phosphate 30 MG BID 2200 AC 
 
  PO 
 
 
 
 
Assessment/Plan
Assessment:
Ms. Solis is a 86 year old woman with a past medical history of stage 1 
diastolic dysfunction with a EF > 65% (), hypertension, hyperlipidemia, 
bronchitis, COPD not on home oxygen, and lung cancer s/p right lobectomy, PE 
post surgery placed on anticoagulation temporarily, SIB Dr. Mckeon's for 
worsening SOB, fever and low O2 sats in mid 80s. Patient received first dose IV 
Azithromycin and Ceftriaxone in ED. 
 
 
Problem list:
Acute hypoxic respiratory failure
Influenza A
Acute bronchitis
COPD exacerbation
 
 
Plan:
TRC/nebs PRN
LRC, blood and urine cultures no growth thus far
Oxygen supplementation, maintain O2 > 92%
Will consider IV Azithromycin if respiratory symptoms do not improve
continue Guaifenesin 
Tamiflu 30 mg BID x 5 days
Prednisone taper upon discharge
IV Lasix 20 mg x 1 dose for CXR findings of vascular congestion
ECHO nl, EF 65%
Continue home meds: Atenolol 25mg, Atorvastatin 40 mg, Escitalopram 20 mg, Lasix
20 mg
Pulm recommendations appreciated
Problem List:
 1. COPD exacerbation
 
 2. Influenza A
 
 3. Bronchitis
 
Pain Ratin
Pain Location:
NA
Pain Goal: Remain pain free
Pain Plan:
NA
Tomorrow's Labs & Rationales:
none
 
 
Jenae Francois MD 18 1047:
Attending MD Review Statement
 
Attending Statement
Attending MD Statement: examined this patient, discuss w/resident/PA/NP, agreed 
w/resident/PA/NP, reviewed EMR data (avail)
Attending Assessment/Plan:
89F with several day history of dyspnea, productive cough, malaise, and weakness
, with multiple sick contacts positive for influenza, found to be influenza A 
positive with a negative CXR.  Afebrile, normal WBC, stable vitals.  Mild 
wheezing on exam, former smoker.  
 
Feels much better today, back to baseline, ambulated with a rolling walker and 
was steady, remains afebrile, eating well, on room air, no dyspnea.
 
1. Influenza A
2. COPD exacerbation
 
Plan
- Stable for discharge back to assisted living
- Continue Tamiflu
- Prednisone taper
- Continue home medications
- Outpatient PCP follow up